# Patient Record
Sex: MALE | Race: WHITE
[De-identification: names, ages, dates, MRNs, and addresses within clinical notes are randomized per-mention and may not be internally consistent; named-entity substitution may affect disease eponyms.]

---

## 2018-12-27 ENCOUNTER — APPOINTMENT (OUTPATIENT)
Dept: ENDOCRINOLOGY | Facility: CLINIC | Age: 43
End: 2018-12-27
Payer: MEDICAID

## 2018-12-27 VITALS
DIASTOLIC BLOOD PRESSURE: 85 MMHG | HEART RATE: 83 BPM | WEIGHT: 226 LBS | BODY MASS INDEX: 32.72 KG/M2 | SYSTOLIC BLOOD PRESSURE: 124 MMHG | HEIGHT: 69.5 IN | OXYGEN SATURATION: 97 %

## 2018-12-27 DIAGNOSIS — Z78.9 OTHER SPECIFIED HEALTH STATUS: ICD-10-CM

## 2018-12-27 DIAGNOSIS — R09.82 POSTNASAL DRIP: ICD-10-CM

## 2018-12-27 DIAGNOSIS — Z83.3 FAMILY HISTORY OF DIABETES MELLITUS: ICD-10-CM

## 2018-12-27 LAB
GLUCOSE BLDC GLUCOMTR-MCNC: 215
HBA1C MFR BLD HPLC: 7.4

## 2018-12-27 PROCEDURE — 82962 GLUCOSE BLOOD TEST: CPT

## 2018-12-27 PROCEDURE — 99244 OFF/OP CNSLTJ NEW/EST MOD 40: CPT | Mod: 25

## 2018-12-27 PROCEDURE — 83036 HEMOGLOBIN GLYCOSYLATED A1C: CPT | Mod: QW

## 2018-12-27 RX ORDER — ATORVASTATIN CALCIUM 10 MG/1
10 TABLET, FILM COATED ORAL
Refills: 0 | Status: ACTIVE | COMMUNITY

## 2018-12-27 RX ORDER — SITAGLIPTIN 100 MG/1
100 TABLET, FILM COATED ORAL DAILY
Qty: 1 | Refills: 1 | Status: ACTIVE | COMMUNITY

## 2018-12-27 RX ORDER — FAMOTIDINE 40 MG/1
40 TABLET, FILM COATED ORAL
Refills: 0 | Status: ACTIVE | COMMUNITY

## 2018-12-27 RX ORDER — METFORMIN ER 500 MG 500 MG/1
500 TABLET ORAL
Qty: 360 | Refills: 0 | Status: ACTIVE | COMMUNITY
Start: 2018-12-27

## 2018-12-27 RX ORDER — LEVOCETIRIZINE DIHYDROCHLORIDE 5 MG/1
5 TABLET, FILM COATED ORAL
Refills: 0 | Status: ACTIVE | COMMUNITY

## 2018-12-27 RX ORDER — ONDANSETRON HYDROCHLORIDE 8 MG/1
8 TABLET, FILM COATED ORAL
Refills: 0 | Status: ACTIVE | COMMUNITY

## 2018-12-27 RX ORDER — ENALAPRIL MALEATE 2.5 MG/1
2.5 TABLET ORAL DAILY
Refills: 0 | Status: ACTIVE | COMMUNITY

## 2018-12-27 RX ORDER — OMEPRAZOLE 20 MG/1
20 CAPSULE, DELAYED RELEASE ORAL
Refills: 0 | Status: ACTIVE | COMMUNITY

## 2018-12-27 RX ORDER — MONTELUKAST SODIUM 10 MG/1
10 TABLET, FILM COATED ORAL
Refills: 0 | Status: ACTIVE | COMMUNITY

## 2018-12-31 ENCOUNTER — LABORATORY RESULT (OUTPATIENT)
Age: 43
End: 2018-12-31

## 2019-01-08 ENCOUNTER — APPOINTMENT (OUTPATIENT)
Dept: ENDOCRINOLOGY | Facility: CLINIC | Age: 44
End: 2019-01-08
Payer: MEDICAID

## 2019-01-08 VITALS
OXYGEN SATURATION: 97 % | SYSTOLIC BLOOD PRESSURE: 130 MMHG | HEART RATE: 95 BPM | BODY MASS INDEX: 33.04 KG/M2 | WEIGHT: 227 LBS | DIASTOLIC BLOOD PRESSURE: 83 MMHG

## 2019-01-08 LAB — GLUCOSE BLDC GLUCOMTR-MCNC: 176

## 2019-01-08 PROCEDURE — 99212 OFFICE O/P EST SF 10 MIN: CPT | Mod: 25

## 2019-01-08 PROCEDURE — 82962 GLUCOSE BLOOD TEST: CPT

## 2019-01-08 NOTE — HISTORY OF PRESENT ILLNESS
[FreeTextEntry1] : Patient with type 2 diabetes. He had the overnight DST which was normal. He completed the comprehensive blood test that Dr. Bear ordered for him this morning. He reports he feels the BS have been better since starting Jardiance. He did not bring in his meter today. Patient presents for Freestyle Saurabh Pro sensor insertion.\par

## 2019-01-08 NOTE — PHYSICAL EXAM
[Alert] : alert [No Acute Distress] : no acute distress [Well Nourished] : well nourished [Normal Rate and Effort] : normal respiratory rhythm and effort [No Accessory Muscle Use] : no accessory muscle use [Clear to Auscultation] : lungs were clear to auscultation bilaterally [Normal Rate] : heart rate was normal  [Normal S1, S2] : normal S1 and S2 [Regular Rhythm] : with a regular rhythm [No Rash] : no rash [Oriented x3] : oriented to person, place, and time [Normal Affect] : the affect was normal [Normal Mood] : the mood was normal

## 2019-01-08 NOTE — ASSESSMENT
[Importance of Diet and Exercise] : importance of diet and exercise to improve glycemic control, achieve weight loss and improve cardiovascular health [FreeTextEntry1] : Patient with type 2 diabetes. At this time, I will not start him on insulin. We will put a sensor on him and evaluate his need for insulin. We discussed the importance of following a carbohydrate balanced diet. I have also advised him he must bring his meter to all appts in this office. He will f/u in 2 weeks for sensor interpretation as well as, blood test results.\par \par \par Today a Freestyle Saurabh Pro Sensor was placed on the patient's left posterior arm. Placement of sensor was without complication. We discussed the maintenance of the sensor for the next two weeks. He was educated on the importance of keeping a blood glucose log and food diary during the 2 weeks period. All questions were answered.\par \par Freestyle Saurabh Pro\par Lot# [id number]\par Serial # [id number]\par Code: H78\par Exp:  6/30/19 [Carbohydrate Consistent Diet] : carbohydrate consistent diet [Injection Technique, Storage, Sharps Disposal] : injection technique, storage, and sharps disposal

## 2019-01-15 ENCOUNTER — TRANSCRIPTION ENCOUNTER (OUTPATIENT)
Age: 44
End: 2019-01-15

## 2019-01-24 ENCOUNTER — APPOINTMENT (OUTPATIENT)
Dept: ENDOCRINOLOGY | Facility: CLINIC | Age: 44
End: 2019-01-24
Payer: MEDICAID

## 2019-01-24 VITALS
OXYGEN SATURATION: 96 % | SYSTOLIC BLOOD PRESSURE: 122 MMHG | DIASTOLIC BLOOD PRESSURE: 79 MMHG | HEIGHT: 69.5 IN | HEART RATE: 103 BPM | BODY MASS INDEX: 32.58 KG/M2 | WEIGHT: 225 LBS

## 2019-01-24 LAB — GLUCOSE BLDC GLUCOMTR-MCNC: 154

## 2019-01-24 PROCEDURE — 95251 CONT GLUC MNTR ANALYSIS I&R: CPT

## 2019-01-24 PROCEDURE — G0108 DIAB MANAGE TRN  PER INDIV: CPT

## 2019-01-24 PROCEDURE — 95250 CONT GLUC MNTR PHYS/QHP EQP: CPT

## 2019-01-30 ENCOUNTER — MEDICATION RENEWAL (OUTPATIENT)
Age: 44
End: 2019-01-30

## 2019-01-30 RX ORDER — EMPAGLIFLOZIN 10 MG/1
10 TABLET, FILM COATED ORAL
Qty: 1 | Refills: 3 | Status: DISCONTINUED | COMMUNITY
Start: 2019-01-24 | End: 2019-01-30

## 2019-02-25 ENCOUNTER — APPOINTMENT (OUTPATIENT)
Dept: ENDOCRINOLOGY | Facility: CLINIC | Age: 44
End: 2019-02-25
Payer: MEDICAID

## 2019-02-25 VITALS
BODY MASS INDEX: 32.58 KG/M2 | WEIGHT: 225 LBS | HEIGHT: 69.5 IN | SYSTOLIC BLOOD PRESSURE: 133 MMHG | HEART RATE: 132 BPM | DIASTOLIC BLOOD PRESSURE: 90 MMHG | OXYGEN SATURATION: 98 %

## 2019-02-25 LAB — GLUCOSE BLDC GLUCOMTR-MCNC: 177

## 2019-02-25 PROCEDURE — 82962 GLUCOSE BLOOD TEST: CPT

## 2019-02-25 PROCEDURE — G0108 DIAB MANAGE TRN  PER INDIV: CPT

## 2019-02-25 RX ORDER — LANCETS 30 GAUGE
EACH MISCELLANEOUS
Qty: 1 | Refills: 5 | Status: ACTIVE | COMMUNITY
Start: 2019-02-25

## 2019-03-10 ENCOUNTER — TRANSCRIPTION ENCOUNTER (OUTPATIENT)
Age: 44
End: 2019-03-10

## 2019-04-08 ENCOUNTER — APPOINTMENT (OUTPATIENT)
Dept: ENDOCRINOLOGY | Facility: CLINIC | Age: 44
End: 2019-04-08
Payer: MEDICAID

## 2019-04-08 VITALS
SYSTOLIC BLOOD PRESSURE: 130 MMHG | HEIGHT: 69.5 IN | DIASTOLIC BLOOD PRESSURE: 84 MMHG | BODY MASS INDEX: 32.43 KG/M2 | WEIGHT: 224 LBS | HEART RATE: 92 BPM

## 2019-04-08 LAB
GLUCOSE BLDC GLUCOMTR-MCNC: 188
HBA1C MFR BLD HPLC: 6.6

## 2019-04-08 PROCEDURE — 99215 OFFICE O/P EST HI 40 MIN: CPT | Mod: 25

## 2019-04-08 PROCEDURE — 83036 HEMOGLOBIN GLYCOSYLATED A1C: CPT | Mod: QW

## 2019-04-08 PROCEDURE — 82962 GLUCOSE BLOOD TEST: CPT

## 2019-04-08 RX ORDER — ERGOCALCIFEROL 1.25 MG/1
1.25 MG CAPSULE, LIQUID FILLED ORAL
Qty: 12 | Refills: 0 | Status: ACTIVE | COMMUNITY
Start: 2019-02-04

## 2019-04-08 NOTE — PHYSICAL EXAM
[Alert] : alert [No Acute Distress] : no acute distress [Well Nourished] : well nourished [Well Developed] : well developed [Normal Sclera/Conjunctiva] : normal sclera/conjunctiva [EOMI] : extra ocular movement intact [No Proptosis] : no proptosis [Normal Oropharynx] : the oropharynx was normal [Thyroid Not Enlarged] : the thyroid was not enlarged [No Thyroid Nodules] : there were no palpable thyroid nodules [No Respiratory Distress] : no respiratory distress [No Accessory Muscle Use] : no accessory muscle use [Clear to Auscultation] : lungs were clear to auscultation bilaterally [Normal Rate] : heart rate was normal  [Normal S1, S2] : normal S1 and S2 [Regular Rhythm] : with a regular rhythm [Pedal Pulses Normal] : the pedal pulses are present [No Edema] : there was no peripheral edema [Normal Bowel Sounds] : normal bowel sounds [Not Tender] : non-tender [Soft] : abdomen soft [Not Distended] : not distended [Penis Abnormality] : the penis was normal [Testes Swelling] : the testicles were not swollen [Scrotum] : the scrotum was normal [Testes Mass (___cm)] : there were no testicular masses [Post Cervical Nodes] : posterior cervical nodes [Anterior Cervical Nodes] : anterior cervical nodes [Axillary Nodes] : axillary nodes [Normal] : normal and non tender [No Spinal Tenderness] : no spinal tenderness [Spine Straight] : spine straight [No Stigmata of Cushings Syndrome] : no stigmata of cushings syndrome [Normal Gait] : normal gait [Normal Strength/Tone] : muscle strength and tone were normal [No Rash] : no rash [Normal Reflexes] : deep tendon reflexes were 2+ and symmetric [No Tremors] : no tremors [Oriented x3] : oriented to person, place, and time [Gynecomastia] : no gynecomastia [Acanthosis Nigricans] : no acanthosis nigricans [de-identified] : normal male hair pattern, has a beard and chest hair

## 2019-04-08 NOTE — HISTORY OF PRESENT ILLNESS
[FreeTextEntry1] : Patient is a 42 yo white male with a 5 year history of type 2 diabetes. Currently he is on glyburide 10 mg ( down from 20), and metformin 2000 mg as well as Januvia 100 mg once. jardiance was changed to Stegaltro.  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 He also has some difficulty losing weight. He was diagnosed with gastroparesis and a few years ago was placed on domperidone. This caused  an elevated prolactin associated with low testosterone. His most recent prolactins were normal but because of this he did have a pituitary MRI that showed the 2-3 mm adenoma. Years ago from [facility] he took an over-the-counter aromatase inhibitor that did seem to boost his testosterone and gave him more energy. He claims to not be taking this anymore.He does complain of low energy and felt much better when he was on this.He has no known complications from the diabetes. His A1C has improved to 6.6.

## 2019-04-08 NOTE — ASSESSMENT
[Carbohydrate Consistent Diet] : carbohydrate consistent diet [Hypoglycemia Management] : hypoglycemia management [Diabetes Foot Care] : diabetes foot care [Long Term Vascular Complications] : long term vascular complications of diabetes [Importance of Diet and Exercise] : importance of diet and exercise to improve glycemic control, achieve weight loss and improve cardiovascular health [Self Monitoring of Blood Glucose] : self monitoring of blood glucose [Retinopathy Screening] : Patient was referred to ophthalmology for retinopathy screening [FreeTextEntry1] : 1) Type 2 diabetes - We did discuss at length the negative effects on being on sulfonylurea for many years. He will decrease from  10 mg to 5 mg of glyburide and he will continue  Januvia, Stelgatro and metformin. I am hesitant to  start a GLP 1 since he was diagnosed with gastroparesis. He does not feel however that the Januvia made it worse. We did discuss nutrition but finds it hard due to his lifestyle. Will make appointment with RD. . Will see if can get him a personal Saurabh. \par 2) Hyperprolactinemia/ Low Testosterone/ Pituitary microadenoma - This most recent prolactin  and testosterone levels were normal.This may been related to medications ( Domperidone)  he was on in the past.The microadenoma may have been  an incidental finding. He will remain off supplements.  Check labs again. DST was normal.  Borderline TFTs and slightly elevated  antibodies. \par 3) Obesity  again discussed diet, f/u RD

## 2019-06-07 ENCOUNTER — RX RENEWAL (OUTPATIENT)
Age: 44
End: 2019-06-07

## 2019-06-07 ENCOUNTER — TRANSCRIPTION ENCOUNTER (OUTPATIENT)
Age: 44
End: 2019-06-07

## 2019-07-15 ENCOUNTER — APPOINTMENT (OUTPATIENT)
Dept: ENDOCRINOLOGY | Facility: CLINIC | Age: 44
End: 2019-07-15

## 2019-08-19 ENCOUNTER — APPOINTMENT (OUTPATIENT)
Dept: ENDOCRINOLOGY | Facility: CLINIC | Age: 44
End: 2019-08-19
Payer: MEDICAID

## 2019-08-19 VITALS
BODY MASS INDEX: 32.51 KG/M2 | HEIGHT: 68.5 IN | DIASTOLIC BLOOD PRESSURE: 98 MMHG | HEART RATE: 96 BPM | SYSTOLIC BLOOD PRESSURE: 148 MMHG | OXYGEN SATURATION: 96 % | WEIGHT: 217 LBS

## 2019-08-19 VITALS — HEART RATE: 102 BPM | OXYGEN SATURATION: 97 % | DIASTOLIC BLOOD PRESSURE: 98 MMHG | SYSTOLIC BLOOD PRESSURE: 130 MMHG

## 2019-08-19 LAB — GLUCOSE BLDC GLUCOMTR-MCNC: 219

## 2019-08-19 PROCEDURE — 99214 OFFICE O/P EST MOD 30 MIN: CPT | Mod: 25

## 2019-08-19 PROCEDURE — 82962 GLUCOSE BLOOD TEST: CPT

## 2019-08-19 RX ORDER — ERTUGLIFLOZIN 15 MG/1
15 TABLET, FILM COATED ORAL
Qty: 90 | Refills: 1 | Status: ACTIVE | COMMUNITY
Start: 2019-01-30 | End: 1900-01-01

## 2019-08-19 NOTE — PHYSICAL EXAM
[Alert] : alert [No Acute Distress] : no acute distress [Well Nourished] : well nourished [Well Developed] : well developed [Normal Sclera/Conjunctiva] : normal sclera/conjunctiva [EOMI] : extra ocular movement intact [No Proptosis] : no proptosis [Normal Oropharynx] : the oropharynx was normal [Thyroid Not Enlarged] : the thyroid was not enlarged [No Thyroid Nodules] : there were no palpable thyroid nodules [No Respiratory Distress] : no respiratory distress [No Accessory Muscle Use] : no accessory muscle use [Clear to Auscultation] : lungs were clear to auscultation bilaterally [Normal Rate] : heart rate was normal  [Normal S1, S2] : normal S1 and S2 [Regular Rhythm] : with a regular rhythm [Pedal Pulses Normal] : the pedal pulses are present [No Edema] : there was no peripheral edema [Gynecomastia] : no gynecomastia [Normal Bowel Sounds] : normal bowel sounds [Not Tender] : non-tender [Soft] : abdomen soft [Not Distended] : not distended [No Spinal Tenderness] : no spinal tenderness [Spine Straight] : spine straight [Normal Gait] : normal gait [Normal Strength/Tone] : muscle strength and tone were normal [No Rash] : no rash [Acanthosis Nigricans] : no acanthosis nigricans [Normal Reflexes] : deep tendon reflexes were 2+ and symmetric [No Tremors] : no tremors [Oriented x3] : oriented to person, place, and time

## 2019-08-19 NOTE — HISTORY OF PRESENT ILLNESS
[FreeTextEntry1] : Patient with well controlled type 2 diabetes. Presently he is on Glyburide 10 mg ( was supposed to go down to 5mg), Januvia, Metformin 2000 mg daily, and Steglatro 5 mg. He does have gastroparesis and finds it difficult to tolerate certain foods. He does eat out for all his meals. He is not able to cook where he lives. Which complicates his diet even more. 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 He occasionally does feel a bit more shaky. He has lost 7lbs since his last visit. He checks his BS 2x daily but did not bring in his meter. His A1C today is 7.0. \par Dr. Bear is also following this patient for elevated prolactin. He was placed on Domperidone,which caused an elevated prolactin associated with low testosterone. His most recent prolactins were normal but because of this he did have a pituitary MRI that showed the 2-3 mm adenoma. Years ago from [facility] he took an over-the-counter aromatase inhibitor that did seem to boost his testosterone and gave him more energy. He claims to not be taking this anymore.He does complain of low energy and felt much better when he was on this.

## 2019-08-19 NOTE — ASSESSMENT
[FreeTextEntry1] : 1. Type 2 diabetes/Obesity - He is agreeable to increasing Steglatro to 15 mg and decreasing Glyburide to 5 mg. We discussed the risks associated with Glyburide.  We discussed at length the importance of following a carbohydrate balanced diet and the importance of incorporating protein in meals. We also discussed appropriate alternative food choices. He does not want to see the RD. We discussed ways he can incorporate exercise into his daily routine. \par 2. Hyperprolactinemia/ Low Testosterone/ Pituitary microadenoma - This most recent prolactin and testosterone levels were normal.This may been related to medications ( Domperidone) he was on in the past.The microadenoma may have been an incidental finding. He will remain off supplements. TFTs mildly elevated. Will discuss with Dr. Bear. \par He will f/u in 3 months  [Carbohydrate Consistent Diet] : carbohydrate consistent diet [Hypoglycemia Management] : hypoglycemia management [Diabetes Foot Care] : diabetes foot care [Long Term Vascular Complications] : long term vascular complications of diabetes [Importance of Diet and Exercise] : importance of diet and exercise to improve glycemic control, achieve weight loss and improve cardiovascular health [Self Monitoring of Blood Glucose] : self monitoring of blood glucose [Injection Technique, Storage, Sharps Disposal] : injection technique, storage, and sharps disposal [Retinopathy Screening] : Patient was referred to ophthalmology for retinopathy screening

## 2019-11-18 ENCOUNTER — APPOINTMENT (OUTPATIENT)
Dept: ENDOCRINOLOGY | Facility: CLINIC | Age: 44
End: 2019-11-18
Payer: MEDICAID

## 2019-11-18 VITALS
OXYGEN SATURATION: 95 % | SYSTOLIC BLOOD PRESSURE: 135 MMHG | WEIGHT: 215.81 LBS | DIASTOLIC BLOOD PRESSURE: 89 MMHG | HEART RATE: 99 BPM | HEIGHT: 68.5 IN | BODY MASS INDEX: 32.33 KG/M2

## 2019-11-18 DIAGNOSIS — K31.84 GASTROPARESIS: ICD-10-CM

## 2019-11-18 DIAGNOSIS — E78.5 HYPERLIPIDEMIA, UNSPECIFIED: ICD-10-CM

## 2019-11-18 LAB
GLUCOSE BLDC GLUCOMTR-MCNC: 222
HBA1C MFR BLD HPLC: 8.7

## 2019-11-18 PROCEDURE — 83036 HEMOGLOBIN GLYCOSYLATED A1C: CPT | Mod: QW

## 2019-11-18 PROCEDURE — 82962 GLUCOSE BLOOD TEST: CPT

## 2019-11-18 PROCEDURE — 99213 OFFICE O/P EST LOW 20 MIN: CPT | Mod: 25

## 2019-11-18 RX ORDER — FLASH GLUCOSE SENSOR
KIT MISCELLANEOUS
Qty: 2 | Refills: 5 | Status: DISCONTINUED | COMMUNITY
Start: 2019-04-15 | End: 2019-11-18

## 2019-11-18 RX ORDER — FLUTICASONE PROPIONATE 50 UG/1
50 SPRAY, METERED NASAL
Qty: 16 | Refills: 0 | Status: DISCONTINUED | COMMUNITY
Start: 2018-09-19 | End: 2019-11-18

## 2019-11-18 RX ORDER — AZITHROMYCIN 250 MG/1
250 TABLET, FILM COATED ORAL
Qty: 6 | Refills: 0 | Status: DISCONTINUED | COMMUNITY
Start: 2019-02-26 | End: 2019-11-18

## 2019-11-18 RX ORDER — GLYBURIDE 5 MG/1
5 TABLET ORAL TWICE DAILY
Refills: 0 | Status: DISCONTINUED | COMMUNITY
End: 2019-11-18
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
## 2019-11-18 NOTE — PHYSICAL EXAM
[Alert] : alert [No Acute Distress] : no acute distress [Well Nourished] : well nourished [Normal Sclera/Conjunctiva] : normal sclera/conjunctiva [Well Developed] : well developed [EOMI] : extra ocular movement intact [No Proptosis] : no proptosis [No Thyroid Nodules] : there were no palpable thyroid nodules [Thyroid Not Enlarged] : the thyroid was not enlarged [Normal Oropharynx] : the oropharynx was normal [No Accessory Muscle Use] : no accessory muscle use [No Respiratory Distress] : no respiratory distress konstantin all pertinent systems normal [Normal Rate] : heart rate was normal  [Clear to Auscultation] : lungs were clear to auscultation bilaterally [Regular Rhythm] : with a regular rhythm [Normal S1, S2] : normal S1 and S2 [Pedal Pulses Normal] : the pedal pulses are present [No Edema] : there was no peripheral edema [Gynecomastia] : no gynecomastia [Normal Bowel Sounds] : normal bowel sounds [Soft] : abdomen soft [Not Tender] : non-tender [Not Distended] : not distended [Penis Abnormality] : the penis was normal [Testes Swelling] : the testicles were not swollen [Scrotum] : the scrotum was normal [Testes Mass (___cm)] : there were no testicular masses [Post Cervical Nodes] : posterior cervical nodes [Anterior Cervical Nodes] : anterior cervical nodes [Axillary Nodes] : axillary nodes [Normal] : normal and non tender [No Spinal Tenderness] : no spinal tenderness [Spine Straight] : spine straight [No Stigmata of Cushings Syndrome] : no stigmata of cushings syndrome [Normal Gait] : normal gait [No Rash] : no rash [Normal Strength/Tone] : muscle strength and tone were normal [Acanthosis Nigricans] : no acanthosis nigricans [Normal Reflexes] : deep tendon reflexes were 2+ and symmetric [Oriented x3] : oriented to person, place, and time [No Tremors] : no tremors [de-identified] : normal male hair pattern, has a beard and chest hair

## 2019-11-18 NOTE — ASSESSMENT
[FreeTextEntry1] : 1) Type 2 diabetes - He will continue  Januvia, Stelgatro and metformin. I am hesitant to  start a GLP 1 since he was diagnosed with gastroparesis. He does not feel however that the Januvia made it worse. We did discuss nutrition but finds it hard due to his lifestyle.Will do a trial of Prandin 0.5 mg 1-2 tabs before meals. Will wear a luis carlos pro before his next IOV in 2  months. t\par 2) Hyperprolactinemia/ Low Testosterone/ Pituitary microadenoma - This most recent prolactin  and testosterone levels were normal.This may been related to medications ( Domperidone)  he was on in the past.The microadenoma may have been  an incidental finding. He will remain off supplements.  Check labs again. DST was normal.  Borderline TFTs and slightly elevated  antibodies. \par 3) Obesity  again discussed diet, f/u RD\par to obtain his recent labs from his PCP.  [Carbohydrate Consistent Diet] : carbohydrate consistent diet [Diabetes Foot Care] : diabetes foot care [Importance of Diet and Exercise] : importance of diet and exercise to improve glycemic control, achieve weight loss and improve cardiovascular health [Self Monitoring of Blood Glucose] : self monitoring of blood glucose [Retinopathy Screening] : Patient was referred to ophthalmology for retinopathy screening

## 2020-01-09 ENCOUNTER — APPOINTMENT (OUTPATIENT)
Dept: ENDOCRINOLOGY | Facility: CLINIC | Age: 45
End: 2020-01-09

## 2020-02-04 ENCOUNTER — APPOINTMENT (OUTPATIENT)
Dept: ENDOCRINOLOGY | Facility: CLINIC | Age: 45
End: 2020-02-04

## 2020-02-18 ENCOUNTER — APPOINTMENT (OUTPATIENT)
Dept: ENDOCRINOLOGY | Facility: CLINIC | Age: 45
End: 2020-02-18
Payer: MEDICAID

## 2020-02-18 VITALS
OXYGEN SATURATION: 97 % | DIASTOLIC BLOOD PRESSURE: 89 MMHG | BODY MASS INDEX: 32.44 KG/M2 | HEIGHT: 69 IN | SYSTOLIC BLOOD PRESSURE: 127 MMHG | WEIGHT: 219 LBS | HEART RATE: 99 BPM

## 2020-02-18 DIAGNOSIS — R79.89 OTHER SPECIFIED ABNORMAL FINDINGS OF BLOOD CHEMISTRY: ICD-10-CM

## 2020-02-18 DIAGNOSIS — D35.2 BENIGN NEOPLASM OF PITUITARY GLAND: ICD-10-CM

## 2020-02-18 LAB
GLUCOSE BLDC GLUCOMTR-MCNC: 189
HBA1C MFR BLD HPLC: 7.9

## 2020-02-18 PROCEDURE — 95250 CONT GLUC MNTR PHYS/QHP EQP: CPT

## 2020-02-18 PROCEDURE — 99214 OFFICE O/P EST MOD 30 MIN: CPT | Mod: 25

## 2020-02-18 PROCEDURE — 95251 CONT GLUC MNTR ANALYSIS I&R: CPT

## 2020-02-18 PROCEDURE — 83036 HEMOGLOBIN GLYCOSYLATED A1C: CPT | Mod: QW

## 2020-02-18 RX ORDER — BLOOD SUGAR DIAGNOSTIC
STRIP MISCELLANEOUS
Qty: 2 | Refills: 5 | Status: ACTIVE | COMMUNITY
Start: 2019-02-25

## 2020-02-18 NOTE — PHYSICAL EXAM
[Alert] : alert [Well Developed] : well developed [Well Nourished] : well nourished [No Acute Distress] : no acute distress [EOMI] : extra ocular movement intact [Normal Sclera/Conjunctiva] : normal sclera/conjunctiva [No Proptosis] : no proptosis [Normal Oropharynx] : the oropharynx was normal [No Thyroid Nodules] : there were no palpable thyroid nodules [Thyroid Not Enlarged] : the thyroid was not enlarged [No Respiratory Distress] : no respiratory distress [No Accessory Muscle Use] : no accessory muscle use [Clear to Auscultation] : lungs were clear to auscultation bilaterally [Normal S1, S2] : normal S1 and S2 [Normal Rate] : heart rate was normal  [Pedal Pulses Normal] : the pedal pulses are present [Regular Rhythm] : with a regular rhythm [Gynecomastia] : no gynecomastia [Normal Bowel Sounds] : normal bowel sounds [No Edema] : there was no peripheral edema [Soft] : abdomen soft [Not Distended] : not distended [Not Tender] : non-tender [Penis Abnormality] : the penis was normal [Testes Mass (___cm)] : there were no testicular masses [Testes Swelling] : the testicles were not swollen [Scrotum] : the scrotum was normal [Post Cervical Nodes] : posterior cervical nodes [Anterior Cervical Nodes] : anterior cervical nodes [No Spinal Tenderness] : no spinal tenderness [Normal] : normal and non tender [Axillary Nodes] : axillary nodes [Spine Straight] : spine straight [No Stigmata of Cushings Syndrome] : no stigmata of cushings syndrome [Normal Gait] : normal gait [Normal Strength/Tone] : muscle strength and tone were normal [No Rash] : no rash [Normal Reflexes] : deep tendon reflexes were 2+ and symmetric [Acanthosis Nigricans] : no acanthosis nigricans [No Tremors] : no tremors [Oriented x3] : oriented to person, place, and time [de-identified] : normal male hair pattern, has a beard and chest hair

## 2020-02-18 NOTE — HISTORY OF PRESENT ILLNESS
[FreeTextEntry1] : Patient is a 45 yo white male with a 5 year history of type 2 diabetes. Currently he is off glyburide  and on metformin 2000 mg , Januvia 100 mg once. and  Stegaltro 15 mg..  He also has been losing weight. He was diagnosed with gastroparesis and a few years ago was placed on domperidone. This caused  an elevated prolactin associated with low testosterone. His most recent prolactins were normal but because of this he did have a pituitary MRI that showed the 2-3 mm adenoma. Years ago from [facility] he took an over-the-counter aromatase inhibitor that did seem to boost his testosterone and gave him more energy. He claims to not be taking this anymore.He does complain of low energy and felt much better when he was on this.He has no known complications from the diabetes. His A1C had improved to 6.6 but is now 8.9. He feels because he is off glyburide and eats mainly carbs due to gastroparesis,

## 2020-02-18 NOTE — ASSESSMENT
[FreeTextEntry1] : 1) Type 2 diabetes - He will continue  Januvia, Stegaltro and metformin. I am hesitant to  start a GLP 1 since he was diagnosed with gastroparesis. He does not feel however that the Januvia made it worse. We did discuss nutrition but finds it hard due to his lifestyle.Stopped Prandin because he felt didn't'’t help so back on glyburide 5 mg BID. CDE discussed Afrezza with him. Will consider; cannot do injections since needle phobic. \par 2) Hyperprolactinemia/ Low Testosterone/ Pituitary microadenoma - This most recent prolactin  and testosterone levels were normal.This may been related to medications ( Domperidone)  he was on in the past.The microadenoma may have been  an incidental finding. He will remain off supplements.  Check labs again. DST was normal.  Borderline TFTs and slightly elevated  antibodies. \par 3) Obesity  again discussed diet, f/u RD\par need labs from PCP. \par sensor: TIR 48 % 48 % above 180 and 8 % above 250  no significant lows.  [Carbohydrate Consistent Diet] : carbohydrate consistent diet [Diabetes Foot Care] : diabetes foot care [Importance of Diet and Exercise] : importance of diet and exercise to improve glycemic control, achieve weight loss and improve cardiovascular health [Long Term Vascular Complications] : long term vascular complications of diabetes [Retinopathy Screening] : Patient was referred to ophthalmology for retinopathy screening [Self Monitoring of Blood Glucose] : self monitoring of blood glucose

## 2020-03-05 ENCOUNTER — APPOINTMENT (OUTPATIENT)
Dept: ENDOCRINOLOGY | Facility: CLINIC | Age: 45
End: 2020-03-05
Payer: MEDICAID

## 2020-03-05 VITALS
OXYGEN SATURATION: 97 % | DIASTOLIC BLOOD PRESSURE: 88 MMHG | HEIGHT: 69 IN | SYSTOLIC BLOOD PRESSURE: 136 MMHG | WEIGHT: 218.25 LBS | HEART RATE: 100 BPM | BODY MASS INDEX: 32.33 KG/M2

## 2020-03-05 LAB — GLUCOSE BLDC GLUCOMTR-MCNC: 184

## 2020-03-05 PROCEDURE — G0108 DIAB MANAGE TRN  PER INDIV: CPT

## 2020-03-05 PROCEDURE — 82962 GLUCOSE BLOOD TEST: CPT

## 2020-03-09 RX ORDER — FLASH GLUCOSE SENSOR
KIT MISCELLANEOUS
Qty: 2 | Refills: 5 | Status: ACTIVE | COMMUNITY
Start: 2020-03-05

## 2020-03-11 RX ORDER — INSULIN HUMAN 4 1/1
4 POWDER, METERED RESPIRATORY (INHALATION)
Qty: 1 | Refills: 0 | Status: ACTIVE | COMMUNITY
Start: 2020-02-18

## 2020-03-19 ENCOUNTER — APPOINTMENT (OUTPATIENT)
Dept: ENDOCRINOLOGY | Facility: CLINIC | Age: 45
End: 2020-03-19
Payer: MEDICAID

## 2020-03-19 VITALS
WEIGHT: 215.13 LBS | DIASTOLIC BLOOD PRESSURE: 90 MMHG | BODY MASS INDEX: 31.86 KG/M2 | OXYGEN SATURATION: 96 % | HEART RATE: 112 BPM | HEIGHT: 69 IN | SYSTOLIC BLOOD PRESSURE: 124 MMHG

## 2020-03-19 DIAGNOSIS — E11.9 TYPE 2 DIABETES MELLITUS W/OUT COMPLICATIONS: ICD-10-CM

## 2020-03-19 DIAGNOSIS — E03.9 HYPOTHYROIDISM, UNSPECIFIED: ICD-10-CM

## 2020-03-19 LAB — GLUCOSE BLDC GLUCOMTR-MCNC: 231

## 2020-03-19 PROCEDURE — 95249 CONT GLUC MNTR PT PROV EQP: CPT

## 2020-03-19 PROCEDURE — 95251 CONT GLUC MNTR ANALYSIS I&R: CPT

## 2020-03-19 PROCEDURE — G0108 DIAB MANAGE TRN  PER INDIV: CPT

## 2020-03-19 RX ORDER — REPAGLINIDE 0.5 MG/1
0.5 TABLET ORAL
Qty: 360 | Refills: 2 | Status: ACTIVE | COMMUNITY
Start: 2019-11-18 | End: 1900-01-01

## 2020-03-19 RX ORDER — LEVOTHYROXINE SODIUM 0.03 MG/1
25 TABLET ORAL
Refills: 0 | Status: ACTIVE | COMMUNITY
Start: 2020-03-19

## 2020-08-05 ENCOUNTER — APPOINTMENT (OUTPATIENT)
Dept: ENDOCRINOLOGY | Facility: CLINIC | Age: 45
End: 2020-08-05

## 2021-04-13 ENCOUNTER — RX RENEWAL (OUTPATIENT)
Age: 46
End: 2021-04-13

## 2021-05-24 ENCOUNTER — RX RENEWAL (OUTPATIENT)
Age: 46
End: 2021-05-24

## 2021-05-24 RX ORDER — BLOOD SUGAR DIAGNOSTIC
STRIP MISCELLANEOUS
Qty: 100 | Refills: 5 | Status: ACTIVE | COMMUNITY
Start: 2021-05-24 | End: 1900-01-01

## 2022-02-07 ENCOUNTER — RX RENEWAL (OUTPATIENT)
Age: 47
End: 2022-02-07

## 2022-04-08 ENCOUNTER — RX RENEWAL (OUTPATIENT)
Age: 47
End: 2022-04-08